# Patient Record
Sex: MALE | URBAN - METROPOLITAN AREA
[De-identification: names, ages, dates, MRNs, and addresses within clinical notes are randomized per-mention and may not be internally consistent; named-entity substitution may affect disease eponyms.]

---

## 2017-07-31 ENCOUNTER — APPOINTMENT (RX ONLY)
Dept: URBAN - METROPOLITAN AREA MEDICAL CENTER 3 | Facility: MEDICAL CENTER | Age: 82
Setting detail: DERMATOLOGY
End: 2017-07-31

## 2017-07-31 PROBLEM — C44.01 BASAL CELL CARCINOMA OF SKIN OF LIP: Status: ACTIVE | Noted: 2017-07-31

## 2017-07-31 PROBLEM — Z85.828 PERSONAL HISTORY OF OTHER MALIGNANT NEOPLASM OF SKIN: Status: ACTIVE | Noted: 2017-07-31

## 2017-07-31 PROBLEM — L57.0 ACTINIC KERATOSIS: Status: ACTIVE | Noted: 2017-07-31

## 2017-07-31 PROBLEM — N40.0 BENIGN PROSTATIC HYPERPLASIA WITHOUT LOWER URINARY TRACT SYMPTOMS: Status: ACTIVE | Noted: 2017-07-31

## 2017-07-31 PROCEDURE — ? CONSULTATION FOR MOHS SURGERY

## 2017-07-31 PROCEDURE — 99203 OFFICE O/P NEW LOW 30 MIN: CPT

## 2017-07-31 NOTE — HPI: MOHS SURGERY CONSULTATION
Has The Cancer Been Biopsied Before?: has been previously biopsied
Additional History: Expressed concern over anesthetic allergy to lidocaine with epi and carbocaine (mepivacaine)---during prior Mohs with Dr. Rankin years ago stopped further surgery due to cardiac/blood pressure side effects and similar stopped further surgery during dental implant procedure for similar side effects.   Tolerated small dose lido with epi for skin biopsies back in 2013.
Who Is Your Referring Provider?: Dr. Cornell
When Was Your Biopsy?: 8/29/2013
Year Removed: 1900
Accession (Optional): [id number]

## 2017-07-31 NOTE — PROCEDURE: CONSULTATION FOR MOHS SURGERY
Detail Level: Detailed
Incorporate Mauc In Note: Yes
X Size Of Lesion In Cm (Optional): 0
Name Of The Referring Provider For Procedure: Dr. Cornell